# Patient Record
Sex: FEMALE | Race: BLACK OR AFRICAN AMERICAN | Employment: OTHER | URBAN - METROPOLITAN AREA
[De-identification: names, ages, dates, MRNs, and addresses within clinical notes are randomized per-mention and may not be internally consistent; named-entity substitution may affect disease eponyms.]

---

## 2020-09-25 ENCOUNTER — HOSPITAL ENCOUNTER (OUTPATIENT)
Dept: MAMMOGRAPHY | Age: 50
Discharge: HOME | End: 2020-09-25
Attending: FAMILY MEDICINE
Payer: OTHER GOVERNMENT

## 2020-09-25 DIAGNOSIS — Z12.31 VISIT FOR SCREENING MAMMOGRAM: ICD-10-CM

## 2020-09-25 PROCEDURE — 77063 BREAST TOMOSYNTHESIS BI: CPT

## 2022-11-21 ENCOUNTER — OFFICE VISIT (OUTPATIENT)
Dept: FAMILY MEDICINE CLINIC | Age: 52
End: 2022-11-21
Payer: COMMERCIAL

## 2022-11-21 VITALS
BODY MASS INDEX: 34.84 KG/M2 | TEMPERATURE: 97 F | OXYGEN SATURATION: 94 % | HEIGHT: 67 IN | RESPIRATION RATE: 17 BRPM | HEART RATE: 84 BPM | SYSTOLIC BLOOD PRESSURE: 125 MMHG | WEIGHT: 222 LBS | DIASTOLIC BLOOD PRESSURE: 88 MMHG

## 2022-11-21 DIAGNOSIS — T78.1XXA ALLERGIC REACTION TO FOOD, INITIAL ENCOUNTER: ICD-10-CM

## 2022-11-21 DIAGNOSIS — Z11.59 NEED FOR HEPATITIS C SCREENING TEST: ICD-10-CM

## 2022-11-21 DIAGNOSIS — E66.9 OBESITY (BMI 30-39.9): Primary | ICD-10-CM

## 2022-11-21 DIAGNOSIS — Z12.31 ENCOUNTER FOR SCREENING MAMMOGRAM FOR MALIGNANT NEOPLASM OF BREAST: ICD-10-CM

## 2022-11-21 DIAGNOSIS — N95.1 MENOPAUSAL VASOMOTOR SYNDROME: ICD-10-CM

## 2022-11-21 DIAGNOSIS — J45.20 MILD INTERMITTENT ASTHMA WITHOUT COMPLICATION: ICD-10-CM

## 2022-11-21 DIAGNOSIS — Z13.220 SCREENING FOR CHOLESTEROL LEVEL: ICD-10-CM

## 2022-11-21 DIAGNOSIS — Z13.1 SCREENING FOR DIABETES MELLITUS: ICD-10-CM

## 2022-11-21 DIAGNOSIS — Z13.0 SCREENING FOR DEFICIENCY ANEMIA: ICD-10-CM

## 2022-11-21 DIAGNOSIS — Z13.228 SCREENING FOR METABOLIC DISORDER: ICD-10-CM

## 2022-11-21 DIAGNOSIS — Z13.29 SCREENING FOR THYROID DISORDER: ICD-10-CM

## 2022-11-21 DIAGNOSIS — M19.90 ARTHRITIS: ICD-10-CM

## 2022-11-21 DIAGNOSIS — Z12.11 SCREEN FOR COLON CANCER: ICD-10-CM

## 2022-11-21 PROBLEM — J45.909 ASTHMA: Status: ACTIVE | Noted: 2022-11-21

## 2022-11-21 PROCEDURE — 99214 OFFICE O/P EST MOD 30 MIN: CPT | Performed by: NURSE PRACTITIONER

## 2022-11-21 RX ORDER — ALBUTEROL SULFATE 90 UG/1
2 AEROSOL, METERED RESPIRATORY (INHALATION)
Qty: 18 G | Refills: 5 | Status: SHIPPED | OUTPATIENT
Start: 2022-11-21

## 2022-11-21 RX ORDER — CELECOXIB 200 MG/1
200 CAPSULE ORAL DAILY
COMMUNITY

## 2022-11-21 RX ORDER — GABAPENTIN 100 MG/1
CAPSULE ORAL
Qty: 50 CAPSULE | Refills: 0 | Status: SHIPPED | OUTPATIENT
Start: 2022-11-21 | End: 2022-12-12

## 2022-11-21 RX ORDER — DOCUSATE SODIUM 100 MG/1
100 CAPSULE, LIQUID FILLED ORAL
COMMUNITY

## 2022-11-21 RX ORDER — EPINEPHRINE 0.3 MG/.3ML
0.3 INJECTION SUBCUTANEOUS AS NEEDED
Qty: 1 EACH | Refills: 3 | Status: SHIPPED | OUTPATIENT
Start: 2022-11-21

## 2022-11-21 RX ORDER — MONTELUKAST SODIUM 10 MG/1
10 TABLET ORAL DAILY
Qty: 90 TABLET | Refills: 3 | Status: SHIPPED | OUTPATIENT
Start: 2022-11-21

## 2022-11-21 RX ORDER — EPINEPHRINE 0.3 MG/.3ML
0.3 INJECTION SUBCUTANEOUS AS NEEDED
COMMUNITY
End: 2022-11-21 | Stop reason: SDUPTHER

## 2022-11-21 RX ORDER — ESTRADIOL 0.03 MG/D
1 PATCH TRANSDERMAL
COMMUNITY
End: 2022-11-21

## 2022-11-21 NOTE — PROGRESS NOTES
General Office Visit Note    Assessment/Plan:     Diagnoses and all orders for this visit:    1. Obesity (BMI 30-39.9)  -     REFERRAL TO WEIGHT LOSS    2. Menopausal vasomotor syndrome  -     gabapentin (NEURONTIN) 100 mg capsule; Take 1 Capsule by mouth nightly for 7 days, THEN 2 Capsules nightly for 7 days, THEN 3 Capsules nightly for 7 days. Max Daily Amount: 300 mg. Indications: \"change of life\" signs    3. Arthritis    4. Mild intermittent asthma without complication  -     albuterol (PROVENTIL HFA, VENTOLIN HFA, PROAIR HFA) 90 mcg/actuation inhaler; Take 2 Puffs by inhalation every four (4) hours as needed for Wheezing, Shortness of Breath or Cough. -     montelukast (SINGULAIR) 10 mg tablet; Take 1 Tablet by mouth daily. 5. Need for hepatitis C screening test  -     HEPATITIS C AB; Future    6. Screen for colon cancer  -     REFERRAL FOR COLONOSCOPY    7. Encounter for screening mammogram for malignant neoplasm of breast  -     MARANDA MAMMO BI SCREENING INCL CAD; Future    8. Screening for deficiency anemia  -     VITAMIN D, 25 HYDROXY; Future    9. Screening for diabetes mellitus  -     HEMOGLOBIN A1C WITH EAG; Future    10. Screening for thyroid disorder  -     TSH 3RD GENERATION; Future    11. Allergic reaction to food, initial encounter  -     EPINEPHrine (EPIPEN) 0.3 mg/0.3 mL injection; 0.3 mL by IntraMUSCular route as needed for Allergic Response. 12. Screening for cholesterol level  -     LIPID PANEL; Future    13. Screening for metabolic disorder  -     METABOLIC PANEL, BASIC; Future      Follow-up and Dispositions    Return in about 4 weeks (around 12/19/2022) for Vasomotor Symptoms (Menopause), Labs, (In Office), (VV). Subjective:     Gail Arredondo is a 50 y.o. y.o. female who complains of   Chief Complaint   Patient presents with    Weight Management     Pt presents for referral to medical weight loss.  Pt has osteoarthritis starting from high school as an athlete, but states she injured her knees in the [employer]. Pt has already had a left knee replacement in January 2022. Pt states that she will need her right knee done as well. Pt states that she has tried to get her extra weight off for the past 2 years. Since her knee surgery, and the pain in her right knee she has not been as mobile as she thinks she should. Pt states she does not eat a lot of starch and carbs. Pt hs tried Keto, and the [employer] Diet. Pt state that with working out she would loose inches, but not lbs. Hot Flashes/Vasomotor Symptom Review:    Pt presents for evaluation of menopausal symptoms. The patient is sexually active. She describes her current menstrual bleeding as s/p partial hysterectomy in 2004. Previous menstrual pattern was normal/cyclic Post menopausal via partial hysterectomy. Additional symptoms include bloating, weight gain, insomnia, hot flashes, anxiety, changes in libido, diarrhea, and constipation. Last Gyne testing: none since 2010, and was normal.   The patient is taking hormone therapy that she states does not work for her and she has been on it for a month from her [place] provider.       Past Medical History:   Diagnosis Date    Anemia NEC always with low iron    [facility] ([facility]ste[facility]) [facility] Fantasma Hawkins since 2004    PTSD from service: in regular counseling at the [place] for this    Arthritis     B/L knees    Asthma     Chronic pain injured in 2003 (fell on knees in active duty) 2004 in [address] in active duty    bilateral knees (VA disability for this), chronic LBP (also VA disability for this)    Depression since 2004    on medication since then: no hospitalizations     Past Surgical History:   Procedure Laterality Date    HX HEENT Right     eye surgery    HX HYSTERECTOMY  2004    partial: fibroids (tubal rupture with tubal pregnancy in the past    HX KNEE ARTHROSCOPY Right 9/2004    right chondromalcia     HX KNEE ARTHROSCOPY Left 2/2005    left chondromalcia     HX LAP CHOLECYSTECTOMY  10/2009    HX TUBAL LIGATION  2000    bilateral following ruptue with a tubal pregnancy     Social History     Socioeconomic History    Marital status:    Tobacco Use    Smoking status: Never    Smokeless tobacco: Never   Substance and Sexual Activity    Alcohol use: Yes     Comment: rare / social    Drug use: No    Sexual activity: Yes     Partners: Male     Birth control/protection: Surgical     Comment: [facility]     Social Determinants of Health     Financial Resource Strain: Low Risk     Difficulty of Paying Living Expenses: Not hard at all   Food Insecurity: No Food Insecurity    Worried About Running Out of Food in the Last Year: Never true    Ran Out of Food in the Last Year: Never true     Current Outpatient Medications   Medication Sig Dispense Refill    EPINEPHrine (EPIPEN) 0.3 mg/0.3 mL injection 0.3 mL by IntraMUSCular route as needed for Allergic Response. 1 Each 3    celecoxib (CeleBREX) 200 mg capsule Take 200 mg by mouth daily. multivitamin, tx-iron-ca-min (THERA-M w/ IRON) 9 mg iron-400 mcg tab tablet Take 1 Tablet by mouth daily. docusate sodium (COLACE) 100 mg capsule Take 100 mg by mouth. Two pills once a day      albuterol (PROVENTIL HFA, VENTOLIN HFA, PROAIR HFA) 90 mcg/actuation inhaler Take 2 Puffs by inhalation every four (4) hours as needed for Wheezing, Shortness of Breath or Cough. 18 g 5    gabapentin (NEURONTIN) 100 mg capsule Take 1 Capsule by mouth nightly for 7 days, THEN 2 Capsules nightly for 7 days, THEN 3 Capsules nightly for 7 days. Max Daily Amount: 300 mg. Indications: \"change of life\" signs 50 Capsule 0    montelukast (SINGULAIR) 10 mg tablet Take 1 Tablet by mouth daily. 90 Tablet 3    NAPROXEN (NAPROSYN PO) Take 2 Tabs by mouth as needed.  (Patient not taking: Reported on 11/21/2022)       Allergies   Allergen Reactions    Aspirin Shortness of Breath    Milk Containing Products Hives, Shortness of Breath, Itching and Swelling    Morphine Rash     The patient has a family history of    REVIEW OF SYSTEMS  ROS    Objective:     Visit Vitals  /88 (BP 1 Location: Left upper arm, BP Patient Position: Sitting, BP Cuff Size: Large adult)   Pulse 84   Temp 97 °F (36.1 °C) (Temporal)   Resp 17   Ht 5' 7\" (1.702 m)   Wt 222 lb (100.7 kg)   LMP 02/01/2004   SpO2 94%   BMI 34.77 kg/m²       Current Outpatient Medications   Medication Instructions    albuterol (PROVENTIL HFA, VENTOLIN HFA, PROAIR HFA) 90 mcg/actuation inhaler 2 Puffs, Inhalation, EVERY 4 HOURS AS NEEDED    celecoxib (CELEBREX) 200 mg, Oral, DAILY    docusate sodium (COLACE) 100 mg, Oral, Two pills once a day    EPINEPHrine (EPIPEN) 0.3 mg, IntraMUSCular, AS NEEDED    gabapentin (NEURONTIN) 100 mg capsule Take 1 Capsule by mouth nightly for 7 days, THEN 2 Capsules nightly for 7 days, THEN 3 Capsules nightly for 7 days. Max Daily Amount: 300 mg. Indications: \"change of life\" signs    montelukast (SINGULAIR) 10 mg, Oral, DAILY    multivitamin, tx-iron-ca-min (THERA-M w/ IRON) 9 mg iron-400 mcg tab tablet 1 Tablet, Oral, DAILY    NAPROXEN (NAPROSYN PO) 2 Tablets, AS NEEDED        PHYSICAL EXAM  Physical Exam  Vitals and nursing note reviewed. Constitutional:       Appearance: Normal appearance. Cardiovascular:      Rate and Rhythm: Normal rate and regular rhythm. Pulses: Normal pulses. Heart sounds: Normal heart sounds. Pulmonary:      Effort: Pulmonary effort is normal.      Breath sounds: Normal breath sounds. Abdominal:      General: Bowel sounds are normal.      Palpations: Abdomen is soft. Musculoskeletal:         General: Normal range of motion. Cervical back: Normal range of motion and neck supple. Skin:     General: Skin is warm and dry. Neurological:      Mental Status: She is alert and oriented to person, place, and time.    Psychiatric:         Mood and Affect: Mood normal.         Behavior: Behavior normal. Disclaimer: The patient understands our medical plan. Alternatives have been explained and offered. The risks, benefits and significant side effects of all medications have been reviewed. Anticipated time course and progression of condition reviewed. All questions have been addressed. She is encouraged to employ the information provided in the after visit summary, which was reviewed. Where applicable, she is instructed to call the clinic if she has not been notified either by phone or through [address] with the results of her tests or with an appointment plan for any referrals within 1 week(s). No news is not good news; it's no news. The patient  is to call if her condition worsens or fails to improve or if significant side effects are experienced. Aspects of this note may have been generated using voice recognition software. Despite editing, there may be unrecognized errors. condition worsens or fails to improve or if significant side effects are experienced. Please note that this dictation was completed with [employer], the computer voice recognition software. Quite often unanticipated grammatical, syntax, homophones, and other interpretive errors are inadvertently transcribed by the computer software. Please disregard these errors. Please excuse any errors that have escaped final proofreading.     Michael Contreras NP     11/21/2022

## 2022-11-22 ENCOUNTER — HOSPITAL ENCOUNTER (OUTPATIENT)
Dept: LAB | Age: 52
Discharge: HOME | End: 2022-11-22
Payer: COMMERCIAL

## 2022-11-22 ENCOUNTER — APPOINTMENT (OUTPATIENT)
Dept: FAMILY MEDICINE CLINIC | Age: 52
End: 2022-11-22

## 2022-11-22 DIAGNOSIS — Z13.29 SCREENING FOR THYROID DISORDER: ICD-10-CM

## 2022-11-22 DIAGNOSIS — Z13.228 SCREENING FOR METABOLIC DISORDER: ICD-10-CM

## 2022-11-22 DIAGNOSIS — Z13.1 SCREENING FOR DIABETES MELLITUS: ICD-10-CM

## 2022-11-22 DIAGNOSIS — Z13.0 SCREENING FOR DEFICIENCY ANEMIA: ICD-10-CM

## 2022-11-22 DIAGNOSIS — Z13.220 SCREENING FOR CHOLESTEROL LEVEL: ICD-10-CM

## 2022-11-22 DIAGNOSIS — Z11.59 NEED FOR HEPATITIS C SCREENING TEST: ICD-10-CM

## 2022-11-22 LAB
25(OH)D3 SERPL-MCNC: 62.7 NG/ML (ref 30–100)
ANION GAP SERPL CALC-SCNC: 2 MMOL/L (ref 3–18)
BUN SERPL-MCNC: 15 MG/DL (ref 7–18)
BUN/CREAT SERPL: 18 (ref 12–20)
CALCIUM SERPL-MCNC: 9.6 MG/DL (ref 8.5–10.1)
CHLORIDE SERPL-SCNC: 106 MMOL/L (ref 100–111)
CHOLEST SERPL-MCNC: 145 MG/DL
CO2 SERPL-SCNC: 32 MMOL/L (ref 21–32)
CREAT SERPL-MCNC: 0.82 MG/DL (ref 0.6–1.3)
EST. AVERAGE GLUCOSE BLD GHB EST-MCNC: 137 MG/DL
GLUCOSE SERPL-MCNC: 138 MG/DL (ref 74–99)
HBA1C MFR BLD: 6.4 % (ref 4.2–5.6)
HDLC SERPL-MCNC: 73 MG/DL (ref 40–60)
HDLC SERPL: 2 {RATIO} (ref 0–5)
LDLC SERPL CALC-MCNC: 57.6 MG/DL (ref 0–100)
LIPID PROFILE,FLP: ABNORMAL
POTASSIUM SERPL-SCNC: 4 MMOL/L (ref 3.5–5.5)
SODIUM SERPL-SCNC: 140 MMOL/L (ref 136–145)
TRIGL SERPL-MCNC: 72 MG/DL (ref ?–150)
TSH SERPL DL<=0.05 MIU/L-ACNC: 2.05 UIU/ML (ref 0.36–3.74)
VLDLC SERPL CALC-MCNC: 14.4 MG/DL

## 2022-11-22 PROCEDURE — 83036 HEMOGLOBIN GLYCOSYLATED A1C: CPT

## 2022-11-22 PROCEDURE — 84443 ASSAY THYROID STIM HORMONE: CPT

## 2022-11-22 PROCEDURE — 80048 BASIC METABOLIC PNL TOTAL CA: CPT

## 2022-11-22 PROCEDURE — 80061 LIPID PANEL: CPT

## 2022-11-22 PROCEDURE — 36415 COLL VENOUS BLD VENIPUNCTURE: CPT

## 2022-11-22 PROCEDURE — 82306 VITAMIN D 25 HYDROXY: CPT

## 2022-11-22 PROCEDURE — 86803 HEPATITIS C AB TEST: CPT

## 2022-11-23 LAB
HCV AB SER IA-ACNC: 0.04 INDEX
HCV AB SERPL QL IA: NEGATIVE
HCV COMMENT,HCGAC: NORMAL

## 2022-12-27 ENCOUNTER — VIRTUAL VISIT (OUTPATIENT)
Dept: FAMILY MEDICINE CLINIC | Age: 52
End: 2022-12-27
Payer: COMMERCIAL

## 2022-12-27 DIAGNOSIS — N95.1 MENOPAUSAL VASOMOTOR SYNDROME: Primary | ICD-10-CM

## 2022-12-27 PROCEDURE — 99214 OFFICE O/P EST MOD 30 MIN: CPT | Performed by: NURSE PRACTITIONER

## 2022-12-27 RX ORDER — GABAPENTIN 100 MG/1
CAPSULE ORAL
COMMUNITY
End: 2022-12-27 | Stop reason: SDUPTHER

## 2022-12-27 RX ORDER — FLUTICASONE PROPIONATE AND SALMETEROL 250; 50 UG/1; UG/1
POWDER RESPIRATORY (INHALATION)
COMMUNITY
Start: 2022-02-03

## 2022-12-27 RX ORDER — DULOXETIN HYDROCHLORIDE 30 MG/1
30 CAPSULE, DELAYED RELEASE ORAL
COMMUNITY
Start: 2022-12-05

## 2022-12-27 RX ORDER — QUETIAPINE FUMARATE 100 MG/1
50 TABLET, FILM COATED ORAL
COMMUNITY
Start: 2022-12-05

## 2022-12-27 RX ORDER — BUPROPION HYDROCHLORIDE 150 MG/1
150 TABLET, EXTENDED RELEASE ORAL
COMMUNITY
Start: 2022-12-05

## 2022-12-27 RX ORDER — PAROXETINE 7.5 MG/1
7.5 CAPSULE ORAL
Qty: 90 CAPSULE | Refills: 0 | Status: SHIPPED | OUTPATIENT
Start: 2022-12-27

## 2022-12-27 RX ORDER — ATORVASTATIN CALCIUM 10 MG/1
10 TABLET, FILM COATED ORAL
COMMUNITY
Start: 2022-02-03

## 2022-12-27 RX ORDER — ASPIRIN 325 MG
1250 TABLET, DELAYED RELEASE (ENTERIC COATED) ORAL
COMMUNITY
Start: 2022-06-22

## 2022-12-27 RX ORDER — METFORMIN HYDROCHLORIDE 500 MG/1
500 TABLET ORAL
COMMUNITY
Start: 2022-02-03

## 2022-12-27 RX ORDER — NALOXONE HYDROCHLORIDE 4 MG/.1ML
SPRAY NASAL
COMMUNITY
Start: 2022-02-16

## 2022-12-27 RX ORDER — GABAPENTIN 400 MG/1
400 CAPSULE ORAL
Qty: 90 CAPSULE | Refills: 3 | Status: SHIPPED | OUTPATIENT
Start: 2022-12-27

## 2022-12-27 RX ORDER — DICLOFENAC SODIUM 10 MG/G
GEL TOPICAL
COMMUNITY
Start: 2022-08-13

## 2022-12-27 NOTE — PROGRESS NOTES
1. \"Have you been to the ER, urgent care clinic since your last visit? Hospitalized since your last visit? \" No    2. \"Have you seen or consulted any other health care providers outside of the [address] [place] [address] since your last visit? \" Yes Mental Health, Dental      3. For patients aged 39-70: Has the patient had a colonoscopy / FIT/ Cologuard? No      If the patient is female:    4. For patients aged 41-77: Has the patient had a mammogram within the past 2 years? Yes - no Care Gap present      5. For patients aged 21-65: Has the patient had a pap smear?  Yes - no Care Gap present

## 2023-01-31 DIAGNOSIS — Z12.31 ENCOUNTER FOR SCREENING MAMMOGRAM FOR MALIGNANT NEOPLASM OF BREAST: Primary | ICD-10-CM

## 2023-02-04 DIAGNOSIS — Z12.31 ENCOUNTER FOR SCREENING MAMMOGRAM FOR MALIGNANT NEOPLASM OF BREAST: Primary | ICD-10-CM

## 2023-02-14 DIAGNOSIS — E66.9 OBESITY (BMI 30-39.9): Primary | ICD-10-CM

## 2023-05-03 ENCOUNTER — OFFICE VISIT (OUTPATIENT)
Age: 53
End: 2023-05-03
Payer: COMMERCIAL

## 2023-05-03 VITALS
BODY MASS INDEX: 31.39 KG/M2 | HEIGHT: 67 IN | DIASTOLIC BLOOD PRESSURE: 95 MMHG | HEART RATE: 83 BPM | TEMPERATURE: 97.3 F | SYSTOLIC BLOOD PRESSURE: 127 MMHG | OXYGEN SATURATION: 99 % | WEIGHT: 200 LBS | RESPIRATION RATE: 17 BRPM

## 2023-05-03 DIAGNOSIS — Z71.3 ENCOUNTER FOR DIETARY CONSULTATION: ICD-10-CM

## 2023-05-03 DIAGNOSIS — Z71.3 WEIGHT LOSS COUNSELING, ENCOUNTER FOR: ICD-10-CM

## 2023-05-03 DIAGNOSIS — E66.09 CLASS 1 OBESITY DUE TO EXCESS CALORIES WITHOUT SERIOUS COMORBIDITY WITH BODY MASS INDEX (BMI) OF 30.0 TO 30.9 IN ADULT: Primary | ICD-10-CM

## 2023-05-03 PROCEDURE — 99203 OFFICE O/P NEW LOW 30 MIN: CPT | Performed by: NURSE PRACTITIONER

## 2023-05-03 RX ORDER — SEMAGLUTIDE 0.25 MG/.5ML
0.25 INJECTION, SOLUTION SUBCUTANEOUS
Qty: 4 ML | Refills: 0 | Status: SHIPPED | OUTPATIENT
Start: 2023-05-03

## 2023-05-03 ASSESSMENT — ENCOUNTER SYMPTOMS
GASTROINTESTINAL NEGATIVE: 1
EYES NEGATIVE: 1
RESPIRATORY NEGATIVE: 1
ALLERGIC/IMMUNOLOGIC NEGATIVE: 1

## 2023-05-03 NOTE — PROGRESS NOTES
Behavior: Behavior normal.            Assessment & Plan  Based on his history and exam, Jose Ibarra is a good candidate for the Non-[facility] Weight Loss medication Program. Patient has previously tried phentermine and Contrave for weight loss and did achieve weight loss, but she was unable to maintain the weight loss. In addition,, she states that she did not like how the Contrave made her feel so she opted to cease taking it. Dietary recall reflects that patient's overall dietary selections are heavily laden in sugar. She eats pineapples, mangoes, bananas, grapes, and watermelon for breakfast all high sugar concentration foods. In addition she endorses that she consumes a 12 pack of sodas per week and consumes 2-3 glasses of juice daily. Patient does not currently have a regular exercise regimen as she had a L knee replacement in 2022 and the knee has been determined to be too big so she is scheduled to have another knee replacement in a few months and has been advised to not put any unnecessary friction on that knee in the meantime. Discussed with patient that the GLP medication would be a good fit for her since she has tried both Contrave and phentermine in the past with unsustainable weight loss. Counseled patient that she has a sugar addiction based on the fruits, juices and sodas that she consumes and the GLP medication is known to cause food aversions, which for her would be a positive thing. Explained to patient how GLP 1 medications work, administration, dosage titration, and potential side effects. Advised patient to reduce her jusice and soda consumption and increase her water consumption to at least 64oz daily which she can flavor with crystal light, Abhijit, or [address]. Also advised patient to focus on having a high protein and vegetable diet while minimizing consumption of carbohydrates, starches, and sugars.     Diet Plan: High protein/vegetable low carbs/starches/ sugars      Activity Plan:

## 2023-05-05 ENCOUNTER — TELEPHONE (OUTPATIENT)
Age: 53
End: 2023-05-05

## 2023-05-09 ENCOUNTER — TELEPHONE (OUTPATIENT)
Age: 53
End: 2023-05-09

## 2023-05-09 DIAGNOSIS — E66.09 CLASS 1 OBESITY DUE TO EXCESS CALORIES WITH SERIOUS COMORBIDITY AND BODY MASS INDEX (BMI) OF 31.0 TO 31.9 IN ADULT: Primary | ICD-10-CM

## 2023-05-09 RX ORDER — LIRAGLUTIDE 6 MG/ML
INJECTION, SOLUTION SUBCUTANEOUS
Qty: 5 ADJUSTABLE DOSE PRE-FILLED PEN SYRINGE | Refills: 4 | Status: SHIPPED | OUTPATIENT
Start: 2023-05-09 | End: 2023-05-12 | Stop reason: ALTCHOICE

## 2023-05-09 NOTE — TELEPHONE ENCOUNTER
----- Message from Mihir Ricardo sent at 5/9/2023  2:35 PM EDT -----  Subject: Appointment Request    Reason for Call: Established Patient Appointment needed: Routine Pre-Op    QUESTIONS    Reason for appointment request? Available appointments did not meet   patient need     Additional Information for Provider?  Pt wants [facility]V pre-op for left knee   revision being done on 6/5.   ---------------------------------------------------------------------------  --------------  Antonio ARCHIBALD  [phone]; OK to leave message on voicemail  ---------------------------------------------------------------------------  --------------  SCRIPT ANSWERS  COVID Screen: Katarina Cheng

## 2023-05-12 ENCOUNTER — OFFICE VISIT (OUTPATIENT)
Age: 53
End: 2023-05-12
Payer: COMMERCIAL

## 2023-05-12 VITALS
RESPIRATION RATE: 18 BRPM | WEIGHT: 200 LBS | OXYGEN SATURATION: 95 % | DIASTOLIC BLOOD PRESSURE: 86 MMHG | HEIGHT: 67 IN | BODY MASS INDEX: 31.39 KG/M2 | TEMPERATURE: 97.3 F | HEART RATE: 91 BPM | SYSTOLIC BLOOD PRESSURE: 124 MMHG

## 2023-05-12 DIAGNOSIS — Z79.4 TYPE 2 DIABETES MELLITUS WITH HYPERGLYCEMIA, WITH LONG-TERM CURRENT USE OF INSULIN (HCC): Primary | ICD-10-CM

## 2023-05-12 DIAGNOSIS — E11.65 TYPE 2 DIABETES MELLITUS WITH HYPERGLYCEMIA, WITH LONG-TERM CURRENT USE OF INSULIN (HCC): Primary | ICD-10-CM

## 2023-05-12 DIAGNOSIS — I10 PRIMARY HYPERTENSION: ICD-10-CM

## 2023-05-12 DIAGNOSIS — N95.1 MENOPAUSAL AND FEMALE CLIMACTERIC STATES: ICD-10-CM

## 2023-05-12 LAB — HBA1C MFR BLD: 12.2 %

## 2023-05-12 PROCEDURE — 3074F SYST BP LT 130 MM HG: CPT | Performed by: NURSE PRACTITIONER

## 2023-05-12 PROCEDURE — 99215 OFFICE O/P EST HI 40 MIN: CPT | Performed by: NURSE PRACTITIONER

## 2023-05-12 PROCEDURE — 3078F DIAST BP <80 MM HG: CPT | Performed by: NURSE PRACTITIONER

## 2023-05-12 PROCEDURE — 83036 HEMOGLOBIN GLYCOSYLATED A1C: CPT | Performed by: NURSE PRACTITIONER

## 2023-05-12 RX ORDER — BLOOD-GLUCOSE METER
EACH MISCELLANEOUS
Qty: 1 KIT | Refills: 0 | Status: SHIPPED | OUTPATIENT
Start: 2023-05-12

## 2023-05-12 RX ORDER — BLOOD SUGAR DIAGNOSTIC
STRIP MISCELLANEOUS
Qty: 100 EACH | Refills: 5 | Status: SHIPPED | OUTPATIENT
Start: 2023-05-12

## 2023-05-12 RX ORDER — BUTALBITAL, ACETAMINOPHEN AND CAFFEINE 300; 40; 50 MG/1; MG/1; MG/1
1 CAPSULE ORAL EVERY 4 HOURS
COMMUNITY
Start: 2019-06-25

## 2023-05-12 RX ORDER — AMOXICILLIN 500 MG/1
CAPSULE ORAL
COMMUNITY

## 2023-05-12 RX ORDER — BUPROPION HYDROCHLORIDE 150 MG/1
150 TABLET, EXTENDED RELEASE ORAL
COMMUNITY
Start: 2022-12-05

## 2023-05-12 RX ORDER — DULOXETIN HYDROCHLORIDE 30 MG/1
30 CAPSULE, DELAYED RELEASE ORAL
COMMUNITY
Start: 2022-12-05

## 2023-05-12 RX ORDER — PAROXETINE 7.5 MG/1
7.5 CAPSULE ORAL
COMMUNITY
Start: 2022-12-27 | End: 2023-05-16 | Stop reason: ALTCHOICE

## 2023-05-12 RX ORDER — AMLODIPINE BESYLATE 5 MG/1
TABLET ORAL
COMMUNITY
Start: 2023-03-30

## 2023-05-12 RX ORDER — GABAPENTIN 400 MG/1
800 CAPSULE ORAL
COMMUNITY
Start: 2022-05-12

## 2023-05-12 RX ORDER — QUETIAPINE FUMARATE 100 MG/1
50 TABLET, FILM COATED ORAL
COMMUNITY
Start: 2022-12-05

## 2023-05-12 RX ORDER — PRAZOSIN HYDROCHLORIDE 5 MG/1
5 CAPSULE ORAL
COMMUNITY
Start: 2022-12-05

## 2023-05-12 RX ORDER — METFORMIN HYDROCHLORIDE 1000 MG/1
1000 TABLET, FILM COATED, EXTENDED RELEASE ORAL 2 TIMES DAILY WITH MEALS
Qty: 180 TABLET | Refills: 1 | Status: SHIPPED | OUTPATIENT
Start: 2023-05-12 | End: 2023-05-16 | Stop reason: ALTCHOICE

## 2023-05-12 RX ORDER — NALOXONE HYDROCHLORIDE 4 MG/.1ML
SPRAY NASAL
COMMUNITY
Start: 2022-02-16

## 2023-05-12 RX ORDER — ATORVASTATIN CALCIUM 10 MG/1
TABLET, FILM COATED ORAL
COMMUNITY
Start: 2023-03-13

## 2023-05-12 RX ORDER — TOPIRAMATE 50 MG/1
50 TABLET, FILM COATED ORAL
COMMUNITY
Start: 2023-02-12

## 2023-05-12 RX ORDER — LISINOPRIL 5 MG/1
5 TABLET ORAL DAILY
Qty: 30 TABLET | Refills: 11 | Status: SHIPPED | OUTPATIENT
Start: 2023-05-12

## 2023-05-12 RX ORDER — LANCETS
EACH MISCELLANEOUS
Qty: 100 EACH | Refills: 3 | Status: SHIPPED | OUTPATIENT
Start: 2023-05-12

## 2023-05-12 RX ORDER — METHOCARBAMOL 750 MG/1
750 TABLET, FILM COATED ORAL
COMMUNITY
Start: 2023-03-27

## 2023-05-12 RX ORDER — INSULIN GLARGINE 100 [IU]/ML
15 INJECTION, SOLUTION SUBCUTANEOUS NIGHTLY
Qty: 10 ML | Refills: 3 | Status: SHIPPED | OUTPATIENT
Start: 2023-05-12 | End: 2023-05-17

## 2023-05-12 ASSESSMENT — PATIENT HEALTH QUESTIONNAIRE - PHQ9
1. LITTLE INTEREST OR PLEASURE IN DOING THINGS: 0
SUM OF ALL RESPONSES TO PHQ9 QUESTIONS 1 & 2: 0
2. FEELING DOWN, DEPRESSED OR HOPELESS: 0
SUM OF ALL RESPONSES TO PHQ QUESTIONS 1-9: 0

## 2023-05-16 RX ORDER — PAROXETINE 10 MG/1
10 TABLET, FILM COATED ORAL DAILY
Qty: 30 TABLET | Refills: 3 | Status: SHIPPED | OUTPATIENT
Start: 2023-05-16

## 2023-05-17 DIAGNOSIS — Z79.4 TYPE 2 DIABETES MELLITUS WITH HYPERGLYCEMIA, WITH LONG-TERM CURRENT USE OF INSULIN (HCC): ICD-10-CM

## 2023-05-17 DIAGNOSIS — E11.65 TYPE 2 DIABETES MELLITUS WITH HYPERGLYCEMIA, WITH LONG-TERM CURRENT USE OF INSULIN (HCC): ICD-10-CM

## 2023-05-22 DIAGNOSIS — E11.65 TYPE 2 DIABETES MELLITUS WITH HYPERGLYCEMIA, WITH LONG-TERM CURRENT USE OF INSULIN (HCC): ICD-10-CM

## 2023-05-22 DIAGNOSIS — Z79.4 TYPE 2 DIABETES MELLITUS WITH HYPERGLYCEMIA, WITH LONG-TERM CURRENT USE OF INSULIN (HCC): ICD-10-CM

## 2023-07-03 ENCOUNTER — TELEPHONE (OUTPATIENT)
Age: 53
End: 2023-07-03

## 2023-07-03 NOTE — TELEPHONE ENCOUNTER
Last appointment: 06/12/2023    Next appointment: 07/12/2023    Pharmacy requesting 90 day supply for     LISINOPRIL 5 MG TABLET    Pharmacy  [facility], [address] [place] [address],[id number]   Phone:  [phone]

## 2023-07-03 NOTE — TELEPHONE ENCOUNTER
Last appointment: 06/12/2023    Next appointment: 07/12/2023    Pharmacy requesting 90 day supply for     PARoxetine (PAXIL) 10 MG tablet      Pharmacy  [facility]pharm[facility] Justin Valenzuela   [facility], [facility] [facility]   Phone:  [phone]

## 2023-07-28 DIAGNOSIS — E11.65 TYPE 2 DIABETES MELLITUS WITH HYPERGLYCEMIA, WITH LONG-TERM CURRENT USE OF INSULIN (HCC): ICD-10-CM

## 2023-07-28 DIAGNOSIS — Z79.4 TYPE 2 DIABETES MELLITUS WITH HYPERGLYCEMIA, WITH LONG-TERM CURRENT USE OF INSULIN (HCC): ICD-10-CM

## 2023-07-28 RX ORDER — LANCETS 30 GAUGE
EACH MISCELLANEOUS
Qty: 100 EACH | Refills: 5 | Status: SHIPPED | OUTPATIENT
Start: 2023-07-28

## 2023-07-28 NOTE — TELEPHONE ENCOUNTER
Last Visit: 6/12/23   Next Appointment: 8/14/23    Requested Prescriptions     Pending Prescriptions Disp Refills    Lancets (Liana Dobbins) Alyx Boyer [Pharmacy Med Name: [place]  1     Sig: CHECK BLOOD SUGAR 4 TIMES DAILY

## 2023-08-26 ENCOUNTER — HOSPITAL ENCOUNTER (OUTPATIENT)
Facility: HOSPITAL | Age: 53
Discharge: HOME | End: 2023-08-29
Payer: COMMERCIAL

## 2023-08-26 LAB
ALBUMIN SERPL-MCNC: 3.8 G/DL (ref 3.4–5)
ALBUMIN/GLOB SERPL: 1.2 (ref 0.8–1.7)
ALP SERPL-CCNC: 66 U/L (ref 45–117)
ALT SERPL-CCNC: 53 U/L (ref 13–56)
ANION GAP SERPL CALC-SCNC: 6 MMOL/L (ref 3–18)
AST SERPL-CCNC: 24 U/L (ref 10–38)
BILIRUB SERPL-MCNC: 0.5 MG/DL (ref 0.2–1)
BUN SERPL-MCNC: 12 MG/DL (ref 7–18)
BUN/CREAT SERPL: 15 (ref 12–20)
CALCIUM SERPL-MCNC: 9 MG/DL (ref 8.5–10.1)
CHLORIDE SERPL-SCNC: 105 MMOL/L (ref 100–111)
CHOLEST SERPL-MCNC: 184 MG/DL
CO2 SERPL-SCNC: 28 MMOL/L (ref 21–32)
CREAT SERPL-MCNC: 0.79 MG/DL (ref 0.6–1.3)
CREAT UR-MCNC: 624 MG/DL (ref 30–125)
EST. AVERAGE GLUCOSE BLD GHB EST-MCNC: 123 MG/DL
GLOBULIN SER CALC-MCNC: 3.3 G/DL (ref 2–4)
GLUCOSE SERPL-MCNC: 100 MG/DL (ref 74–99)
HBA1C MFR BLD: 5.9 % (ref 4.2–5.6)
HDLC SERPL-MCNC: 83 MG/DL (ref 40–60)
HDLC SERPL: 2.2 (ref 0–5)
LDLC SERPL CALC-MCNC: 85.8 MG/DL (ref 0–100)
LIPID PANEL: ABNORMAL
MICROALBUMIN UR-MCNC: 5.22 MG/DL (ref 0–3)
MICROALBUMIN/CREAT UR-RTO: 8 MG/G (ref 0–30)
POTASSIUM SERPL-SCNC: 3.3 MMOL/L (ref 3.5–5.5)
PROT SERPL-MCNC: 7.1 G/DL (ref 6.4–8.2)
SODIUM SERPL-SCNC: 139 MMOL/L (ref 136–145)
TRIGL SERPL-MCNC: 76 MG/DL
TSH SERPL DL<=0.05 MIU/L-ACNC: 2.47 UIU/ML (ref 0.36–3.74)
VLDLC SERPL CALC-MCNC: 15.2 MG/DL

## 2023-08-26 PROCEDURE — 80053 COMPREHEN METABOLIC PANEL: CPT

## 2023-08-26 PROCEDURE — 82043 UR ALBUMIN QUANTITATIVE: CPT

## 2023-08-26 PROCEDURE — 36415 COLL VENOUS BLD VENIPUNCTURE: CPT

## 2023-08-26 PROCEDURE — 84443 ASSAY THYROID STIM HORMONE: CPT

## 2023-08-26 PROCEDURE — 82570 ASSAY OF URINE CREATININE: CPT

## 2023-08-26 PROCEDURE — 83036 HEMOGLOBIN GLYCOSYLATED A1C: CPT

## 2023-08-26 PROCEDURE — 80061 LIPID PANEL: CPT

## 2023-08-28 ENCOUNTER — TELEMEDICINE (OUTPATIENT)
Age: 53
End: 2023-08-28
Payer: COMMERCIAL

## 2023-08-28 DIAGNOSIS — I10 PRIMARY HYPERTENSION: ICD-10-CM

## 2023-08-28 DIAGNOSIS — E11.65 TYPE 2 DIABETES MELLITUS WITH HYPERGLYCEMIA, WITH LONG-TERM CURRENT USE OF INSULIN (HCC): ICD-10-CM

## 2023-08-28 DIAGNOSIS — Z79.4 TYPE 2 DIABETES MELLITUS WITH HYPERGLYCEMIA, WITH LONG-TERM CURRENT USE OF INSULIN (HCC): ICD-10-CM

## 2023-08-28 DIAGNOSIS — E87.6 HYPOKALEMIA: Primary | ICD-10-CM

## 2023-08-28 DIAGNOSIS — R60.0 LOWER EXTREMITY EDEMA: ICD-10-CM

## 2023-08-28 PROCEDURE — 3044F HG A1C LEVEL LT 7.0%: CPT | Performed by: NURSE PRACTITIONER

## 2023-08-28 PROCEDURE — 99214 OFFICE O/P EST MOD 30 MIN: CPT | Performed by: NURSE PRACTITIONER

## 2023-08-28 RX ORDER — LOSARTAN POTASSIUM 25 MG/1
12.5 TABLET ORAL DAILY
Qty: 90 TABLET | Refills: 0 | Status: SHIPPED | OUTPATIENT
Start: 2023-08-28

## 2023-08-28 RX ORDER — AMOXICILLIN 500 MG/1
CAPSULE ORAL
COMMUNITY

## 2023-08-28 RX ORDER — AMLODIPINE BESYLATE 5 MG/1
5 TABLET ORAL DAILY
Qty: 90 TABLET | Refills: 1 | Status: SHIPPED | OUTPATIENT
Start: 2023-08-28

## 2023-08-28 RX ORDER — HYDROCHLOROTHIAZIDE 25 MG/1
25 TABLET ORAL EVERY MORNING
Qty: 90 TABLET | Refills: 1 | Status: SHIPPED | OUTPATIENT
Start: 2023-08-28

## 2023-08-28 RX ORDER — LISINOPRIL 5 MG/1
TABLET ORAL
COMMUNITY
Start: 2023-08-09 | End: 2023-08-28 | Stop reason: ALTCHOICE

## 2023-08-28 RX ORDER — POTASSIUM CHLORIDE 20 MEQ/1
20 TABLET, EXTENDED RELEASE ORAL DAILY
Qty: 90 TABLET | Refills: 1 | Status: SHIPPED | OUTPATIENT
Start: 2023-08-28

## 2023-08-28 ASSESSMENT — PATIENT HEALTH QUESTIONNAIRE - PHQ9
SUM OF ALL RESPONSES TO PHQ QUESTIONS 1-9: 0
SUM OF ALL RESPONSES TO PHQ9 QUESTIONS 1 & 2: 0
SUM OF ALL RESPONSES TO PHQ QUESTIONS 1-9: 0
2. FEELING DOWN, DEPRESSED OR HOPELESS: 0
SUM OF ALL RESPONSES TO PHQ QUESTIONS 1-9: 0
SUM OF ALL RESPONSES TO PHQ QUESTIONS 1-9: 0
1. LITTLE INTEREST OR PLEASURE IN DOING THINGS: 0

## 2023-08-28 NOTE — PROGRESS NOTES
Virtual Visit    Assessment/Plan:   Below is the assessment and plan developed based on review of pertinent history, physical exam, labs, studies, and medications. Gama Vo was seen today for diabetes. Diagnoses and all orders for this visit:    Hypokalemia  -     potassium chloride (KLOR-CON M) 20 MEQ extended release tablet; Take 1 tablet by mouth daily    Type 2 diabetes mellitus with hyperglycemia, with long-term current use of insulin (HCC)  -     Semaglutide,0.25 or 0.5MG/DOS, 2 MG/1.5ML SOPN; Inject 0.5 mg into the skin every 7 days    Lower extremity edema  -     hydroCHLOROthiazide (HYDRODIURIL) 25 MG tablet; Take 1 tablet by mouth every morning    Primary hypertension  -     losartan (COZAAR) 25 MG tablet; Take 0.5 tablets by mouth daily  -     amLODIPine (NORVASC) 5 MG tablet; Take 1 tablet by mouth daily     Will D/C insulin, and go up on Semaglutide to 0.5 mg Weekly    Follow-up and Dispositions    Return in about 3 months (around 2023) for HTN, HLD, DM, In Office, MyChart (VV), A1C in 3 mths. Subjective:     Gama Vo is a 48 y.o. y.o. female who complains of   Chief Complaint   Patient presents with    Diabetes      Follow up DM:    metFORMIN - 1000 MG     atorvastatin - 10 MG    New concerns: N/A. Since last visit, she  reports: no significant changes. She reports medication compliance: compliant most of the time. Medication side effects: none. Diabetic diet compliance: compliant most of the time   Activity level:moderately active    Home glucoses:  Fastin's-100's  PP: 130's  Hypoglycemic episodes: N/S    There are no preventive care reminders to display for this patient. Objective:     Patient-Reported Vitals  No data recorded        Hiram Cheema, was evaluated through a synchronous (real-time) audio-video encounter. The patient (or guardian if applicable) is aware that this is a billable service, which includes applicable co-pays.  This Virtual Visit

## 2023-09-14 DIAGNOSIS — N95.1 MENOPAUSAL AND FEMALE CLIMACTERIC STATES: ICD-10-CM

## 2023-09-15 RX ORDER — PAROXETINE 10 MG/1
10 TABLET, FILM COATED ORAL DAILY
Qty: 30 TABLET | Refills: 3 | Status: SHIPPED | OUTPATIENT
Start: 2023-09-15

## 2023-10-25 ENCOUNTER — TELEPHONE (OUTPATIENT)
Age: 53
End: 2023-10-25

## 2023-10-25 NOTE — TELEPHONE ENCOUNTER
Last appointment: 08/28/2023    Next appointment: 11/29/2023    Insurance requesting 90 day script for     lisinopril (PRINIVIL;ZESTRIL) 5 MG tablet [[id number]]       Ph[facility]acy   [facility] [address] [place] [address] [phone] - F [phone] (Pharmacy)

## 2023-11-06 DIAGNOSIS — Z79.4 TYPE 2 DIABETES MELLITUS WITH HYPERGLYCEMIA, WITH LONG-TERM CURRENT USE OF INSULIN (HCC): ICD-10-CM

## 2023-11-06 DIAGNOSIS — E11.65 TYPE 2 DIABETES MELLITUS WITH HYPERGLYCEMIA, WITH LONG-TERM CURRENT USE OF INSULIN (HCC): ICD-10-CM

## 2023-11-06 NOTE — TELEPHONE ENCOUNTER
Last Visit: 08- VV   Next Appointment: 11-  Previous Refill Encounter: 05- #180tabs with 1 refills      Requested Prescriptions     Pending Prescriptions Disp Refills    metFORMIN (GLUCOPHAGE) 1000 MG tablet [Pharmacy Med Name: METFORMIN HCL 1,000 MG TABLET] 180 tablet 1     Sig: TAKE 1 TABLET BY MOUTH TWICE A DAY WITH MEALS

## 2023-11-15 ENCOUNTER — TELEPHONE (OUTPATIENT)
Age: 53
End: 2023-11-15

## 2023-11-15 NOTE — TELEPHONE ENCOUNTER
Contacted pt to reschedule follow up appt, pt is currently out of the country on honeymoon and will reschedule when comes back, pt did schedule a pre-op needed for scheduled knee surgery dated for 12/13, will provide surgeon's name once returns and labs will be completed prior to that appt

## 2023-11-28 ENCOUNTER — OFFICE VISIT (OUTPATIENT)
Age: 53
End: 2023-11-28
Payer: COMMERCIAL

## 2023-11-28 VITALS
WEIGHT: 196.2 LBS | SYSTOLIC BLOOD PRESSURE: 117 MMHG | DIASTOLIC BLOOD PRESSURE: 75 MMHG | TEMPERATURE: 97.2 F | BODY MASS INDEX: 30.79 KG/M2 | OXYGEN SATURATION: 100 % | HEART RATE: 86 BPM | HEIGHT: 67 IN | RESPIRATION RATE: 20 BRPM

## 2023-11-28 DIAGNOSIS — Z01.818 PREOPERATIVE CLEARANCE: Primary | ICD-10-CM

## 2023-11-28 PROCEDURE — 99214 OFFICE O/P EST MOD 30 MIN: CPT | Performed by: NURSE PRACTITIONER

## 2023-11-28 ASSESSMENT — PATIENT HEALTH QUESTIONNAIRE - PHQ9
SUM OF ALL RESPONSES TO PHQ QUESTIONS 1-9: 0
SUM OF ALL RESPONSES TO PHQ QUESTIONS 1-9: 0
1. LITTLE INTEREST OR PLEASURE IN DOING THINGS: 0
SUM OF ALL RESPONSES TO PHQ QUESTIONS 1-9: 0
2. FEELING DOWN, DEPRESSED OR HOPELESS: 0
SUM OF ALL RESPONSES TO PHQ9 QUESTIONS 1 & 2: 0
SUM OF ALL RESPONSES TO PHQ QUESTIONS 1-9: 0

## 2023-11-28 NOTE — PROGRESS NOTES
Subjective:      Melissa Hansen is a 48 y.o.  female who presents to the office today for a preoperative consultation at the request of surgeon Dr. Dale Cano who plans on performing revision of left total knee arthroplasty on December 13. This consultation is requested for the specific conditions prompting preoperative evaluation (i.e. because of potential affect on operative risk): Soo Vu Planned anesthesia is General.  The patient has the following known anesthesia issues:  none denies fmh mother (with birth of her brother unsure of issue but reports since then had surgeries without complications   Patient has a bleeding risk of : no recent abnormal bleeding, no remote history of abnormal bleeding, MI, CAD. Patient does not have objection to receiving blood products if needed. Denies latex allergies. Hx of asthma reports stable on Singulair. Last use since end of August.   Hx of diabetes taking Metformin and ozempic A1C 5.5 11/20/2023  Htn- stable on Losartan.  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

## 2023-11-28 NOTE — PROGRESS NOTES
1. \"Have you been to the ER, urgent care clinic since your last visit? Hospitalized since your last visit? \" No    2. \"Have you seen or consulted any other health care providers outside of the [address] [place] [address] since your last visit? \" No     3. For patients aged 43-73: Has the patient had a colonoscopy / FIT/ Cologuard? No      If the patient is female:    4. For patients aged 43-66: Has the patient had a mammogram within the past 2 years? No      5. For patients aged 21-65: Has the patient had a pap smear?  Yes - no Care Gap present

## 2023-12-05 ENCOUNTER — TELEPHONE (OUTPATIENT)
Age: 53
End: 2023-12-05

## 2023-12-05 NOTE — TELEPHONE ENCOUNTER
Marcela with [facility] called to request preop notes from 11/28 appt, printed and faxed

## 2023-12-06 DIAGNOSIS — J45.20 MILD INTERMITTENT ASTHMA, UNCOMPLICATED: ICD-10-CM

## 2023-12-06 NOTE — TELEPHONE ENCOUNTER
Last Visit: 11- OV   Next Appointment: none  Previous Refill Encounter: 11- #90 tabs with 3 refills      Requested Prescriptions     Pending Prescriptions Disp Refills    montelukast (SINGULAIR) 10 MG tablet [Pharmacy Med Name: MONTELUKAST SOD 10 MG TABLET] 90 tablet 3     Sig: TAKE 1 TABLET BY MOUTH EVERY DAY

## 2023-12-07 RX ORDER — MONTELUKAST SODIUM 10 MG/1
10 TABLET ORAL DAILY
Qty: 90 TABLET | Refills: 3 | Status: SHIPPED | OUTPATIENT
Start: 2023-12-07

## 2023-12-13 PROBLEM — T84.023A: Status: ACTIVE | Noted: 2023-12-13

## 2023-12-13 PROBLEM — T84.033D: Status: ACTIVE | Noted: 2023-12-13

## 2024-01-30 ENCOUNTER — HOSPITAL ENCOUNTER (OUTPATIENT)
Facility: HOSPITAL | Age: 54
Discharge: HOME | End: 2024-02-02
Payer: OTHER GOVERNMENT

## 2024-01-30 ENCOUNTER — OFFICE VISIT (OUTPATIENT)
Age: 54
End: 2024-01-30
Payer: COMMERCIAL

## 2024-01-30 VITALS — HEIGHT: 68 IN | DIASTOLIC BLOOD PRESSURE: 77 MMHG | SYSTOLIC BLOOD PRESSURE: 108 MMHG | BODY MASS INDEX: 29.9 KG/M2

## 2024-01-30 VITALS — HEIGHT: 68 IN | BODY MASS INDEX: 29.91 KG/M2

## 2024-01-30 DIAGNOSIS — N95.1 MENOPAUSAL VASOMOTOR SYNDROME: ICD-10-CM

## 2024-01-30 DIAGNOSIS — R60.0 LOWER EXTREMITY EDEMA: ICD-10-CM

## 2024-01-30 DIAGNOSIS — Z13.29 SCREENING FOR THYROID DISORDER: ICD-10-CM

## 2024-01-30 DIAGNOSIS — Z12.31 SCREENING MAMMOGRAM FOR HIGH-RISK PATIENT: ICD-10-CM

## 2024-01-30 DIAGNOSIS — E87.6 HYPOKALEMIA: ICD-10-CM

## 2024-01-30 DIAGNOSIS — I10 PRIMARY HYPERTENSION: Primary | ICD-10-CM

## 2024-01-30 DIAGNOSIS — N95.1 MENOPAUSAL AND FEMALE CLIMACTERIC STATES: ICD-10-CM

## 2024-01-30 DIAGNOSIS — Z79.4 TYPE 2 DIABETES MELLITUS WITH HYPERGLYCEMIA, WITH LONG-TERM CURRENT USE OF INSULIN (HCC): ICD-10-CM

## 2024-01-30 DIAGNOSIS — E11.65 TYPE 2 DIABETES MELLITUS WITH HYPERGLYCEMIA, WITH LONG-TERM CURRENT USE OF INSULIN (HCC): ICD-10-CM

## 2024-01-30 LAB — HBA1C MFR BLD: 5.8 %

## 2024-01-30 PROCEDURE — 3078F DIAST BP <80 MM HG: CPT | Performed by: NURSE PRACTITIONER

## 2024-01-30 PROCEDURE — 99214 OFFICE O/P EST MOD 30 MIN: CPT | Performed by: NURSE PRACTITIONER

## 2024-01-30 PROCEDURE — 83036 HEMOGLOBIN GLYCOSYLATED A1C: CPT | Performed by: NURSE PRACTITIONER

## 2024-01-30 PROCEDURE — 77063 BREAST TOMOSYNTHESIS BI: CPT

## 2024-01-30 PROCEDURE — 3074F SYST BP LT 130 MM HG: CPT | Performed by: NURSE PRACTITIONER

## 2024-01-30 RX ORDER — POTASSIUM CHLORIDE 20 MEQ/1
20 TABLET, EXTENDED RELEASE ORAL DAILY
Qty: 90 TABLET | Refills: 0 | Status: SHIPPED | OUTPATIENT
Start: 2024-01-30

## 2024-01-30 RX ORDER — LOSARTAN POTASSIUM 25 MG/1
12.5 TABLET ORAL NIGHTLY
Qty: 90 TABLET | Refills: 1 | Status: SHIPPED | OUTPATIENT
Start: 2024-01-30

## 2024-01-30 RX ORDER — PAROXETINE 10 MG/1
10 TABLET, FILM COATED ORAL DAILY
Qty: 90 TABLET | Refills: 1 | Status: SHIPPED | OUTPATIENT
Start: 2024-01-30

## 2024-01-30 RX ORDER — ATORVASTATIN CALCIUM 10 MG/1
10 TABLET, FILM COATED ORAL NIGHTLY
Qty: 90 TABLET | Refills: 1 | Status: SHIPPED | OUTPATIENT
Start: 2024-01-30

## 2024-01-30 RX ORDER — AMLODIPINE BESYLATE 5 MG/1
5 TABLET ORAL DAILY
Qty: 90 TABLET | Refills: 1 | Status: SHIPPED | OUTPATIENT
Start: 2024-01-30

## 2024-01-30 RX ORDER — HYDROCHLOROTHIAZIDE 25 MG/1
25 TABLET ORAL EVERY MORNING
Qty: 90 TABLET | Refills: 1 | Status: SHIPPED | OUTPATIENT
Start: 2024-01-30

## 2024-01-30 RX ORDER — HYDROMORPHONE HYDROCHLORIDE 2 MG/1
TABLET ORAL
COMMUNITY
Start: 2023-12-28

## 2024-01-30 NOTE — PROGRESS NOTES
1. \"Have you been to the ER, urgent care clinic since your last visit?  Hospitalized since your last visit?\" No    2. \"Have you seen or consulted any other health care providers outside of the [facility] since your last visit?\" No     3. For patients aged 45-75: Has the patient had a colonoscopy / FIT/ Cologuard? No      If the patient is female:    4. For patients aged 40-74: Has the patient had a mammogram within the past 2 years? Yes - no Care Gap present      5. For patients aged 21-65: Has the patient had a pap smear? Yes - no Care Gap present

## 2024-01-30 NOTE — PATIENT INSTRUCTIONS
Patient Education        Diabetes Blood Sugar Emergencies: Your Action Plan  How can you prevent a blood sugar emergency?  An important part of living with diabetes is keeping your blood sugar in your target range. You'll need to know what to do if it's too high or too low. Managing your blood sugar levels helps you avoid emergencies. This care sheet will teach you about the signs of high and low blood sugar. It will help you make an action plan with your doctor for when these signs occur.  Low blood sugar is more likely to happen if you take certain medicines for diabetes. It can also happen if you skip a meal, drink alcohol, or exercise more than usual.  You may get high blood sugar if you eat differently than you normally do. One example is eating more carbohydrate than usual. Having a cold, the flu, or other sudden illness can also cause high blood sugar levels. Levels can also rise if you miss a dose of medicine.  Any change in how you take your medicine may affect your blood sugar level. So it's important to work with your doctor before you make any changes.  Track your blood sugar  Work with your doctor to fill in the blank spaces below that apply to you.   Track your levels, know your target range, and write down ways you can get your blood sugar back in your target range. A log book can help you track your levels. Take the book to all of your medical appointments.  Check your blood sugar _____ times a day, at these times:________________________________________________.  (For example: Before meals, at bedtime, before exercise, during exercise, other.)  Your blood sugar target range before a meal is ___________________. Your blood sugar target range after a meal is _______________________.  Do this--___________________________________________________--to get your blood sugar back within your safe range if your blood sugar results are _________________________________________. (For example: Less than 70 or

## 2024-03-25 NOTE — PROGRESS NOTES
Marv Cesar is a 50 y.o. female who was seen by synchronous (real-time) audio-video technology on 12/27/2022 for No chief complaint on file. Assessment & Plan:   Diagnoses and all orders for this visit:    1. Menopausal vasomotor syndrome  -     PARoxetine mesylate,menop.sym, 7.5 mg cap; Take 7.5 mg by mouth nightly. Indications: \"change of life\" signs  -     gabapentin (NEURONTIN) 400 mg capsule; Take 1 Capsule by mouth nightly. Max Daily Amount: 400 mg. Follow-up and Dispositions    Return in about 6 weeks (around 2/7/2023) for Vasomotor Symptoms (Menopause), (In Office), (VV). Subjective:     Hot Flashes/Vasomotor Symptom Review:    Pt presents for evaluation of menopausal symptoms. The patient is sexually active. She describes her current menstrual bleeding as s/p partial hysterectomy in 2004. Previous menstrual pattern was normal/cyclic Post menopausal via partial hysterectomy. Additional symptoms include bloating, weight gain, insomnia, hot flashes, anxiety, changes in libido, diarrhea, and constipation. Last Gyne testing: none since 2010, and was normal.   The patient is no longer taking hormone therapy that she states does not work for her and she has been on it for a month from her [place] provider. Pt is still having the sweating and hot flashes, mostly at night, but during the day as well. Pt is taking 300 mg of gabaoentin at night, with no improvement in symptoms    Prior to Admission medications    Medication Sig Start Date End Date Taking?  Authorizing Provider   atorvastatin (LIPITOR) 10 mg tablet 10 mg. 2/3/22  Yes Provider, Historical   buPROPion SR [facility] SR) 150 mg SR tablet 150 mg. 12/5/22  Yes Provider, Historical   cholecalciferol (VITAMIN D3) (50,000 UNITS /1250 MCG) capsule 1,250 mcg. 6/22/22  Yes Provider, Historical   diclofenac (VOLTAREN) 1 % gel APPLY FOUR GRAMS TO INTACT SKIN OF PAINFUL AFFECTED AREA FOUR TIMES A DAY AS NEEDED ** PLEASE USE DICLOFENAC DOSING CARD FOR ADMINISTRATION** AS NEEDED FOR PAIN 8/13/22  Yes Provider, Historical   DULoxetine (CYMBALTA) 30 mg capsule 30 mg. 12/5/22  Yes Provider, Historical   fluticasone propion-salmeteroL (ADVAIR/WIXELA) 250-50 mcg/dose diskus inhaler INHALE 1 PUFF BY INHALATION TWICE A DAY RINSE MOUTH OUT WITH WATER AFTER EACH USE. THIS REPLACES SYMBICORT INHALER. 2/3/22  Yes Provider, Historical   metFORMIN (GLUCOPHAGE) 500 mg tablet 500 mg. 2/3/22  Yes Provider, Historical   naloxone (NARCAN) 4 mg/actuation nasal spray SPRAY ONE SPRAY IN ONE NOSTRIL \"FOR EMERGENCY USE ONLY\" AS NEEDED FOR SIGNS OF AN OPIOID OVERDOSE-USE A NEW SPRAY FOR ADDITIONAL DOSES INTO THE OTHER NOSTRIL IF THE PATIENT DOES NOT RESPOND IN 2-3 MINUTES OR RESPONDS AND THEN STOPS BREATHING AGAIN. DO NOT PRIME THE NASAL SPRAY. 2/16/22  Yes Provider, Historical   QUEtiapine (SEROquel) 100 mg tablet 50 mg. 12/5/22  Yes Provider, Historical   PARoxetine mesylate,menop.sym, 7.5 mg cap Take 7.5 mg by mouth nightly. Indications: \"change of life\" signs 12/27/22  Yes Josephine Abraham NP   gabapentin (NEURONTIN) 400 mg capsule Take 1 Capsule by mouth nightly. Max Daily Amount: 400 mg. 12/27/22  Yes Josephine Abraham NP   EPINEPHrine (EPIPEN) 0.3 mg/0.3 mL injection 0.3 mL by IntraMUSCular route as needed for Allergic Response. 11/21/22  Yes Josephine Abraham NP   celecoxib (CELEBREX) 200 mg capsule Take 200 mg by mouth daily. Yes Provider, Historical   multivitamin, tx-iron-ca-min (THERA-M w/ IRON) 9 mg iron-400 mcg tab tablet Take 1 Tablet by mouth daily. Yes Provider, Historical   docusate sodium (COLACE) 100 mg capsule Take 100 mg by mouth. Two pills once a day   Yes Provider, Historical   albuterol (PROVENTIL HFA, VENTOLIN HFA, PROAIR HFA) 90 mcg/actuation inhaler Take 2 Puffs by inhalation every four (4) hours as needed for Wheezing, Shortness of Breath or Cough.  11/21/22  Yes Josephine Abraham NP   montelukast (SINGULAIR) 10 mg tablet Take 1 Tablet by mouth daily. 11/21/22  Yes Gabe Gaytan NP   gabapentin (NEURONTIN) 100 mg capsule gabapentin 100 mg capsule   PLEASE SEE ATTACHED FOR DETAILED DIRECTIONS  12/27/22  Provider, Historical   NAPROXEN (NAPROSYN PO) Take 2 Tabs by mouth as needed. Patient not taking: Reported on 12/27/2022    Provider, Historical     Patient Active Problem List    Diagnosis Date Noted    Asthma 11/21/2022    Arthritis 11/21/2022    Obesity (BMI 30-39.9) 11/21/2022    Menopausal vasomotor syndrome 11/21/2022    Abnormal mammogram/ rt breast/ need one time 6 months follow up bilateral mammogram with rt breast ultrasound then one more 6 months rt bresat ultrasound then yearly follow up.  03/05/2015    Cyst of breast/ f/u ultrasound in 6 months will be jan 2015 07/21/2014    Back pain 06/29/2011    Left knee pain 03/23/2011    Shoulder pain, left 03/23/2011    Neck pain 03/23/2011    Chronic pain     Depression     Anxiety     Anemia NEC      Current Outpatient Medications   Medication Sig Dispense Refill    atorvastatin (LIPITOR) 10 mg tablet 10 mg. buPROPion SR (WELLBUTRIN SR) 150 mg SR tablet 150 mg.      cholecalciferol (VITAMIN D3) (50,000 UNITS /1250 MCG) capsule 1,250 mcg.       diclofenac (VOLTAREN) 1 % gel APPLY FOUR GRAMS TO INTACT SKIN OF PAINFUL AFFECTED AREA FOUR TIMES A DAY AS NEEDED ** PLEASE USE DICLOFENAC DOSING CARD FOR ADMINISTRATION** AS NEEDED FOR PAIN      DULoxetine (CYMBALTA) 30 mg capsule 30 mg.      fluticasone propion-salmeteroL (ADVAIR/WIXELA) 250-50 mcg/dose diskus inhaler INHALE 1 PUFF BY INHALATION TWICE A DAY RINSE MOUTH OUT WITH WATER AFTER EACH USE. THIS REPLACES SYMBICORT INHALER.      metFORMIN (GLUCOPHAGE) 500 mg tablet 500 mg.      naloxone (NARCAN) 4 mg/actuation nasal spray SPRAY ONE SPRAY IN ONE NOSTRIL \"FOR EMERGENCY USE ONLY\" AS NEEDED FOR SIGNS OF AN OPIOID OVERDOSE-USE A NEW SPRAY FOR ADDITIONAL DOSES INTO THE OTHER NOSTRIL IF THE PATIENT DOES NOT RESPOND IN 2-3 MINUTES OR RESPONDS AND THEN STOPS BREATHING AGAIN. DO NOT PRIME THE NASAL SPRAY. QUEtiapine (SEROquel) 100 mg tablet 50 mg. PARoxetine mesylate,menop.sym, 7.5 mg cap Take 7.5 mg by mouth nightly. Indications: \"change of life\" signs 90 Capsule 0    gabapentin (NEURONTIN) 400 mg capsule Take 1 Capsule by mouth nightly. Max Daily Amount: 400 mg. 90 Capsule 3    EPINEPHrine (EPIPEN) 0.3 mg/0.3 mL injection 0.3 mL by IntraMUSCular route as needed for Allergic Response. 1 Each 3    celecoxib (CELEBREX) 200 mg capsule Take 200 mg by mouth daily. multivitamin, tx-iron-ca-min (THERA-M w/ IRON) 9 mg iron-400 mcg tab tablet Take 1 Tablet by mouth daily. docusate sodium (COLACE) 100 mg capsule Take 100 mg by mouth. Two pills once a day      albuterol (PROVENTIL HFA, VENTOLIN HFA, PROAIR HFA) 90 mcg/actuation inhaler Take 2 Puffs by inhalation every four (4) hours as needed for Wheezing, Shortness of Breath or Cough. 18 g 5    montelukast (SINGULAIR) 10 mg tablet Take 1 Tablet by mouth daily. 90 Tablet 3    NAPROXEN (NAPROSYN PO) Take 2 Tabs by mouth as needed.  (Patient not taking: Reported on 12/27/2022)       Allergies   Allergen Reactions    Aspirin Shortness of Breath    Milk Containing Products Hives, Shortness of Breath, Itching and Swelling    Morphine Rash     Past Medical History:   Diagnosis Date    Anemia NEC always with low iron    [facility] deficiency [facility] ([facility]) [facility] 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) 2004 in [address] in active duty    bilateral knees ([facility] disability for this), chronic LBP (also [facility] disability for this)    Depression since 2004    on medication since then: no hospitalizations     Past Surgical History:   Procedure Laterality Date    HX HEENT Right     eye surgery    HX HYSTERECTOMY  2004 partial: fibroids (tubal rupture with tubal pregnancy in the past    HX KNEE ARTHROSCOPY Right 9/2004    right chondromalcia     HX KNEE ARTHROSCOPY Left 2/2005    left chondromalcia     HX LAP CHOLECYSTECTOMY  10/2009    HX TUBAL LIGATION  2000    bilateral following ruptue with a tubal pregnancy         Objective:   No flowsheet data found. General: alert, cooperative, no distress   Mental  status: normal mood, behavior, speech, dress, motor activity, and thought processes, able to follow commands   HENT: NCAT   Neck: no visualized mass   Resp: no respiratory distress   Neuro: no gross deficits   Skin: no discoloration or lesions of concern on visible areas   Psychiatric: normal affect, consistent with stated mood, no evidence of hallucinations     Additional exam findings: We discussed the expected course, resolution and complications of the diagnosis(es) in detail. Medication risks, benefits, costs, interactions, and alternatives were discussed as indicated. I advised her to contact the office if her condition worsens, changes or fails to improve as anticipated. She expressed understanding with the diagnosis(es) and plan. Joanna Prosper, was evaluated through a synchronous (real-time) audio-video encounter. The patient (or guardian if applicable) is aware that this is a billable service, which includes applicable co-pays. Verbal consent to proceed has been obtained. The visit was conducted pursuant to the emergency declaration under the [address] [place] [address] authority and the [employer] and [employer] General Act. Patient identification was verified, and a caregiver was present when appropriate. The patient was located at home in a state where the provider was licensed to provide care. Joannastefan Cheng, was evaluated through a synchronous (real-time) audio-video encounter.  The patient (or guardian if applicable) is aware that this is a billable service, which includes applicable co-pays. This Virtual Visit was conducted with patient's (and/or legal guardian's) consent. The visit was conducted pursuant to the emergency declaration under the [facility], [address] [place] [address] authority and the [employer] and [employer] General Act. Patient identification was verified, and a caregiver was present when appropriate. The patient was located at: Home: [address] [place] [address]  The provider was located at: Facility ([facility] [address]       Aspects of this note may have been generated using voice recognition software. Despite editing, there may be unrecognized errors.      Teresa Manuel NP  12/27/2022 16

## 2024-03-27 DIAGNOSIS — Z79.4 TYPE 2 DIABETES MELLITUS WITH HYPERGLYCEMIA, WITH LONG-TERM CURRENT USE OF INSULIN (HCC): ICD-10-CM

## 2024-03-27 DIAGNOSIS — E11.65 TYPE 2 DIABETES MELLITUS WITH HYPERGLYCEMIA, WITH LONG-TERM CURRENT USE OF INSULIN (HCC): ICD-10-CM

## 2024-03-27 NOTE — TELEPHONE ENCOUNTER
Last appointment: 01/30/2024    Next appointment: 08/15/2024    Pharmacy requesting refill for     OZEMPIC 0.25-0.5 MG/DOSE PEN    Pharmacy     [facility]/pharmacy #[id number] - [place], [place] - [address] [place] - P [phone] - F [phone] (Pharmacy)